# Patient Record
Sex: FEMALE | NOT HISPANIC OR LATINO | URBAN - METROPOLITAN AREA
[De-identification: names, ages, dates, MRNs, and addresses within clinical notes are randomized per-mention and may not be internally consistent; named-entity substitution may affect disease eponyms.]

---

## 2024-03-15 ENCOUNTER — HOSPITAL ENCOUNTER (OUTPATIENT)
Dept: RADIOLOGY | Facility: CLINIC | Age: 24
Discharge: HOME | End: 2024-03-15
Payer: COMMERCIAL

## 2024-03-15 DIAGNOSIS — T83.32XA DISPLACEMENT OF INTRAUTERINE CONTRACEPTIVE DEVICE, INITIAL ENCOUNTER: ICD-10-CM

## 2024-03-15 PROCEDURE — 76856 US EXAM PELVIC COMPLETE: CPT | Performed by: RADIOLOGY

## 2024-03-15 PROCEDURE — 76830 TRANSVAGINAL US NON-OB: CPT | Performed by: RADIOLOGY

## 2024-03-15 PROCEDURE — 76856 US EXAM PELVIC COMPLETE: CPT

## 2024-11-19 ENCOUNTER — APPOINTMENT (OUTPATIENT)
Dept: OBSTETRICS AND GYNECOLOGY | Facility: CLINIC | Age: 24
End: 2024-11-19
Payer: COMMERCIAL

## 2025-01-21 ENCOUNTER — APPOINTMENT (OUTPATIENT)
Dept: PRIMARY CARE | Facility: CLINIC | Age: 25
End: 2025-01-21
Payer: COMMERCIAL

## 2025-01-21 VITALS
BODY MASS INDEX: 30 KG/M2 | DIASTOLIC BLOOD PRESSURE: 84 MMHG | HEIGHT: 62 IN | OXYGEN SATURATION: 99 % | WEIGHT: 163 LBS | HEART RATE: 94 BPM | SYSTOLIC BLOOD PRESSURE: 130 MMHG | TEMPERATURE: 98 F

## 2025-01-21 DIAGNOSIS — Z72.89 CURRENT EVERY DAY VAPING: ICD-10-CM

## 2025-01-21 DIAGNOSIS — Z30.432 AWAITING REMOVAL OF CONTRACEPTIVE INTRAUTERINE DEVICE (IUD): ICD-10-CM

## 2025-01-21 DIAGNOSIS — Z13.220 SCREENING FOR LIPID DISORDERS: ICD-10-CM

## 2025-01-21 DIAGNOSIS — Z13.1 SCREENING FOR DIABETES MELLITUS (DM): ICD-10-CM

## 2025-01-21 DIAGNOSIS — Z00.00 ANNUAL PHYSICAL EXAM: Primary | ICD-10-CM

## 2025-01-21 PROCEDURE — 3008F BODY MASS INDEX DOCD: CPT

## 2025-01-21 PROCEDURE — 99385 PREV VISIT NEW AGE 18-39: CPT

## 2025-01-21 PROCEDURE — 1036F TOBACCO NON-USER: CPT

## 2025-01-21 RX ORDER — ACYCLOVIR 800 MG/1
TABLET ORAL
COMMUNITY
Start: 2025-01-07

## 2025-01-21 RX ORDER — ESCITALOPRAM OXALATE 5 MG/1
1 TABLET ORAL
COMMUNITY
Start: 2025-01-10

## 2025-01-21 RX ORDER — LAMOTRIGINE 100 MG/1
1 TABLET ORAL
COMMUNITY
Start: 2025-01-10

## 2025-01-21 RX ORDER — IBUPROFEN 200 MG
1 TABLET ORAL EVERY 24 HOURS
Qty: 30 PATCH | Refills: 0 | Status: SHIPPED | OUTPATIENT
Start: 2025-01-21 | End: 2025-02-20

## 2025-01-21 ASSESSMENT — PAIN SCALES - GENERAL: PAINLEVEL_OUTOF10: 0-NO PAIN

## 2025-01-21 ASSESSMENT — PATIENT HEALTH QUESTIONNAIRE - PHQ9
2. FEELING DOWN, DEPRESSED OR HOPELESS: NOT AT ALL
1. LITTLE INTEREST OR PLEASURE IN DOING THINGS: NOT AT ALL
SUM OF ALL RESPONSES TO PHQ9 QUESTIONS 1 AND 2: 0

## 2025-01-21 ASSESSMENT — VISUAL ACUITY: OU: 1

## 2025-01-21 NOTE — PROGRESS NOTES
Subjective   Patient ID: Lore Owens is a 24 y.o. female who presents for Annual Exam.    HPI     Lore Owens presents for annual physical exam.  Prashanth is a new patient to this provider, establishing care today.     She does have concerns for persistent cough over the last 6 weeks.  Does vape regularly and is interested in quitting.       Patient's recent visit notes, medication and allergy lists, past medical surgical social hx, immunization, vitals, problem list, recent tests were reviewed by me for pertinence to this visit.      PMH:   Anxiety/OCD/Bipolar II- seeing psychiatrist through [facility].  Sees her every 3 months for medication refills. Was seeing Counselor weekly but now seeing her every few months.  Currently taking escitalopram and lamotrigine 100 mg daily- medications manage symptoms well.     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  She is looking to establish with new GYN.  No monthly menses with IUD. No concerns for STIs today.           Vaccinations:  Tdap: 2022; due 2032  Flu Vaccine: declines        Review of Systems  GENERAL - Denies fever/chills, recent illness, unexplained weight loss  HEENT- Denies change in vision, double vision, blurred. Denies hearing changes, ear pain. Denies nose bleeds. Denies sore throat, difficulty swallowing.    RESP - Denies SOB.  Persistent cough x 6 weeks- see HPI  CVS - Denies CP, palpitations  GI - Denies nausea or abdominal pain, hematochezia/melena  - Denies urinary frequency, urgency or incontinence.  Denies nocturia.   NEURO - Denies headache, dizziness  MSK - Denies  "joint, neck or back pain  Skin - Denies abnormal lesions, rash  PSYCH-Denies anxiety, depression, changes in mood          Objective   /84   Pulse 94   Temp 36.7 °C (98 °F)   Ht 1.562 m (5' 1.5\")   Wt 73.9 kg (163 lb)   SpO2 99%   BMI 30.30 kg/m²     Physical Exam  Vitals and nursing note reviewed.   Constitutional:       General: She is not in acute distress.     Appearance: Normal appearance. She is well-developed and well-groomed.   HENT:      Head: Normocephalic.      Jaw: There is normal jaw occlusion.      Right Ear: Hearing, tympanic membrane, ear canal and external ear normal.      Left Ear: Hearing, tympanic membrane, ear canal and external ear normal.      Nose: Nose normal.      Mouth/Throat:      Lips: Pink.      Mouth: Mucous membranes are moist.      Pharynx: Oropharynx is clear. Uvula midline.   Eyes:      General: Lids are normal. Vision grossly intact. Gaze aligned appropriately.      Extraocular Movements: Extraocular movements intact.      Conjunctiva/sclera: Conjunctivae normal.      Pupils: Pupils are equal, round, and reactive to light.   Neck:      Thyroid: No thyromegaly or thyroid tenderness.      Vascular: No carotid bruit or JVD.      Trachea: Trachea and phonation normal.   Cardiovascular:      Rate and Rhythm: Normal rate and regular rhythm.      Pulses: Normal pulses.      Heart sounds: Normal heart sounds, S1 normal and S2 normal.   Pulmonary:      Effort: Pulmonary effort is normal.      Breath sounds: Normal breath sounds and air entry.   Abdominal:      General: Bowel sounds are normal. There is no distension.      Palpations: Abdomen is soft. There is no hepatomegaly, splenomegaly or mass.      Tenderness: There is no abdominal tenderness. There is no right CVA tenderness, left CVA tenderness, guarding or rebound.   Musculoskeletal:         General: Normal range of motion.      Cervical back: Normal, full passive range of motion without pain, normal range of motion and " neck supple.      Thoracic back: Normal.      Lumbar back: Normal.      Right lower leg: No edema.      Left lower leg: No edema.   Lymphadenopathy:      Cervical: No cervical adenopathy.   Skin:     General: Skin is warm and dry.      Capillary Refill: Capillary refill takes less than 2 seconds.   Neurological:      General: No focal deficit present.      Mental Status: She is alert and oriented to person, place, and time.      Cranial Nerves: Cranial nerves 2-12 are intact.      Sensory: Sensation is intact.      Motor: Motor function is intact.      Coordination: Coordination is intact.      Gait: Gait is intact.   Psychiatric:         Attention and Perception: Attention normal.         Mood and Affect: Mood and affect normal.         Speech: Speech normal.         Behavior: Behavior normal. Behavior is cooperative.           Assessment & Plan  Annual physical exam  Well adult exam.  1. Age appropriate preventative measures reviewed. Due for pap screening- referral to GYN given.   2. Encouraged healthy diet and exercise.  3. Immunizations- Reviewed, Tdap UTD, declines flu  4. Labs- screening labs ordered today  5. Medications- Reviewed-    *Follow-up in 1 year for repeat annual physical exam. Patient verbalizes understanding  regarding plan of care and all questions answered.         Current every day vaping  Discussed vape cessation.    She is interested in beginning nicotine patch. Explained intended effects, potential side effects, and schedule of dosages of the medication.  History of anxiety/depression- will hold of on chantix or Wellbutrin but she may discuss with her psychiatrist.   Orders:    nicotine (Nicoderm CQ) 21 mg/24 hr patch; Place 1 patch over 24 hours on the skin once every 24 hours.    Screening for diabetes mellitus (DM)    Orders:    Glucose, fasting; Future    Screening for lipid disorders    Orders:    Lipid Panel; Future    Awaiting removal of contraceptive intrauterine device  (IUD)    Orders:    Referral to Gynecology; Future

## 2025-02-09 LAB
CHOLEST SERPL-MCNC: 165 MG/DL
CHOLEST/HDLC SERPL: 3.8 (CALC)
GLUCOSE P FAST SERPL-MCNC: 91 MG/DL (ref 65–99)
HDLC SERPL-MCNC: 44 MG/DL
LDLC SERPL CALC-MCNC: 102 MG/DL (CALC)
NONHDLC SERPL-MCNC: 121 MG/DL (CALC)
TRIGL SERPL-MCNC: 93 MG/DL

## 2025-02-20 ENCOUNTER — TELEPHONE (OUTPATIENT)
Dept: OBSTETRICS AND GYNECOLOGY | Facility: CLINIC | Age: 25
End: 2025-02-20

## 2025-02-20 ENCOUNTER — OFFICE VISIT (OUTPATIENT)
Dept: OBSTETRICS AND GYNECOLOGY | Facility: CLINIC | Age: 25
End: 2025-02-20
Payer: COMMERCIAL

## 2025-02-20 ENCOUNTER — HOSPITAL ENCOUNTER (OUTPATIENT)
Dept: RADIOLOGY | Facility: CLINIC | Age: 25
Discharge: HOME | End: 2025-02-20
Payer: COMMERCIAL

## 2025-02-20 VITALS
WEIGHT: 172 LBS | BODY MASS INDEX: 31.65 KG/M2 | SYSTOLIC BLOOD PRESSURE: 120 MMHG | HEIGHT: 62 IN | DIASTOLIC BLOOD PRESSURE: 75 MMHG

## 2025-02-20 DIAGNOSIS — Z01.419 ENCOUNTER FOR ANNUAL ROUTINE GYNECOLOGICAL EXAMINATION: Primary | ICD-10-CM

## 2025-02-20 DIAGNOSIS — Z30.432 AWAITING REMOVAL OF CONTRACEPTIVE INTRAUTERINE DEVICE (IUD): ICD-10-CM

## 2025-02-20 DIAGNOSIS — Z30.431 INTRAUTERINE DEVICE SURVEILLANCE: ICD-10-CM

## 2025-02-20 PROCEDURE — 76856 US EXAM PELVIC COMPLETE: CPT

## 2025-02-20 PROCEDURE — 99385 PREV VISIT NEW AGE 18-39: CPT

## 2025-02-20 PROCEDURE — 1036F TOBACCO NON-USER: CPT

## 2025-02-20 PROCEDURE — 3008F BODY MASS INDEX DOCD: CPT

## 2025-02-20 RX ORDER — LEVONORGESTREL 52 MG/1
1 INTRAUTERINE DEVICE INTRAUTERINE ONCE
COMMUNITY

## 2025-02-20 ASSESSMENT — ENCOUNTER SYMPTOMS
ABDOMINAL PAIN: 0
VOMITING: 0
HEADACHES: 0
SHORTNESS OF BREATH: 0
DEPRESSION: 0
FEVER: 0
NAUSEA: 0
COUGH: 0
OCCASIONAL FEELINGS OF UNSTEADINESS: 0
DYSURIA: 0
UNEXPECTED WEIGHT CHANGE: 0
LOSS OF SENSATION IN FEET: 0
COLOR CHANGE: 0
CHILLS: 0
FATIGUE: 0
DIZZINESS: 0

## 2025-02-20 ASSESSMENT — PATIENT HEALTH QUESTIONNAIRE - PHQ9
1. LITTLE INTEREST OR PLEASURE IN DOING THINGS: NOT AT ALL
2. FEELING DOWN, DEPRESSED OR HOPELESS: NOT AT ALL
SUM OF ALL RESPONSES TO PHQ9 QUESTIONS 1 & 2: 0

## 2025-02-20 ASSESSMENT — PAIN SCALES - GENERAL: PAINLEVEL_OUTOF10: 0-NO PAIN

## 2025-02-20 ASSESSMENT — LIFESTYLE VARIABLES
HOW OFTEN DO YOU HAVE A DRINK CONTAINING ALCOHOL: NEVER
HOW OFTEN DO YOU HAVE SIX OR MORE DRINKS ON ONE OCCASION: NEVER
AUDIT-C TOTAL SCORE: 0
SKIP TO QUESTIONS 9-10: 1
HOW MANY STANDARD DRINKS CONTAINING ALCOHOL DO YOU HAVE ON A TYPICAL DAY: PATIENT DOES NOT DRINK

## 2025-02-20 NOTE — PROGRESS NOTES
"Subjective   Lore Owens is a 24 y.o. female who is here for a routine GYN exam as a new patient establishing care.  -Had Mirena IUD inserted about 7-8 yrs ago at [facility]. Absent bleeding. Denies pelvic pain. She would like a new device.   - Had pelvic US done 3/2024 due to lack of visualization of IUD strings showing IUD in place.  -Denies breast or vaginal symptoms or concerns today.  -Denies any new partners; she is engaged.  -Pt has vulvar birthmark that she recalls having her whole life.    Complaints:   none  Periods: absent with IUD   Current contraception: Mirena IUD   History of abnormal Pap smear: no  History of abnormal mammogram: no      OB History          0    Para   0    Term   0       0    AB   0    Living   0         SAB   0    IAB   0    Ectopic   0    Multiple   0    Live Births   0                  Review of Systems   Constitutional:  Negative for chills, fatigue, fever and unexpected weight change.   Respiratory:  Negative for cough and shortness of breath.    Gastrointestinal:  Negative for abdominal pain, nausea and vomiting.   Genitourinary:  Negative for dyspareunia, dysuria, pelvic pain and vaginal discharge.   Skin:  Negative for color change and rash.   Neurological:  Negative for dizziness and headaches.       Objective   /75   Ht 1.562 m (5' 1.5\")   Wt 78 kg (172 lb)   BMI 31.97 kg/m²        General:   Alert and oriented, in no acute distress   Neck: Supple. No visible thyromegaly.    Breast/Axilla: Normal to palpation bilaterally without masses, skin changes, or nipple discharge.    Abdomen: Soft, non-tender, without masses or organomegaly   Vulva: Normal architecture without erythema, masses, or lesions. Oval raised pink birthmark along right labia/groin region   Vagina: Normal mucosa without lesions, masses, or atrophy. No abnormal vaginal discharge.    Cervix: Normal without masses, lesions, or signs of cervicitis; unable to visualize iud " strings from os; scant amt mixed discharge/menstrual blood; no cmt   Uterus: Normal, mobile, non-enlarged uterus   Adnexa: Normal without masses or lesions   Pelvic Floor Normal    Psych Normal affect. Normal mood.      Assessment/Plan   Diagnoses and all orders for this visit:  Encounter for annual routine gynecological examination  -     THINPREP PAP TEST  Intrauterine device surveillance  -     US PELVIS TRANSABDOMINAL WITH TRANSVAGINAL; Future  - Will obtain US to ensure IUD in place before removal/reinsertion.   - Consent signed for new device. Plan to return for removal/reinsertion procedure. Would advise cytotec beforehand.  Awaiting removal of contraceptive intrauterine device (IUD)  -     Referral to Gynecology    Elvira Sullivan PA-C

## 2025-03-06 LAB
CYTOLOGY CMNT CVX/VAG CYTO-IMP: NORMAL
LAB AP CONTRACEPTIVE HISTORY: NORMAL
LAB AP HPV GENOTYPE QUESTION: YES
LAB AP HPV HR: NORMAL
LABORATORY COMMENT REPORT: NORMAL
PATH REPORT.TOTAL CANCER: NORMAL

## 2025-04-01 NOTE — PROGRESS NOTES
IUD Removal    Performed by: Keo Haddad MD  Authorized by: Keo Haddad MD    Procedure: IUD removal and insertion    Consent obtained by patient, parent, or legal power of  - including discussion of procedure risks and benefits, patient questions answered, and patient education provided: yes    Reason for removal: patient request    Strings visualized: no (Alligator forceps used to retrieve IUD strings from cervical canal without issue.)    Cervix cleaned with: iodopovidone    Tenaculum applied to cervix: yes    IUD grasped by forceps: yes    IUD removed: yes    Date/Time of Removal:  4/3/2025 1:20 PM  Removed without complications: yes    IUD intact: yes    Pregnancy risk comment:  Negative UPT  Date/Time of Insertion:  4/3/2025 1:22 PM  Speculum placed in vagina: yes    Cervix cleaned and prepped: yes    Tenaculum/Allis/Ring Forceps applied to cervix: yes    Uterus sound depth (cm):  7  IUD inserted without complications: yes    OSM: levonorgestrel 20 mcg/24hr  Strings trimmed to (cm):  3  Patient tolerated procedure well: yes    Estimated blood loss (mL): <5 mL.  Intended removal date: 8 years    Insertion comments: Patient tolerated removal and replacement well without issue. Provided with educational pamphlet regarding the Mirena. Advised to follow up in the office in 4 weeks for a string check.

## 2025-04-03 ENCOUNTER — PROCEDURE VISIT (OUTPATIENT)
Dept: OBSTETRICS AND GYNECOLOGY | Facility: CLINIC | Age: 25
End: 2025-04-03
Payer: COMMERCIAL

## 2025-04-03 VITALS — WEIGHT: 180.2 LBS | BODY MASS INDEX: 33.5 KG/M2 | SYSTOLIC BLOOD PRESSURE: 138 MMHG | DIASTOLIC BLOOD PRESSURE: 72 MMHG

## 2025-04-03 DIAGNOSIS — Z30.433 ENCOUNTER FOR IUD REMOVAL AND REINSERTION: Primary | ICD-10-CM

## 2025-04-03 LAB — PREGNANCY TEST URINE, POC: NEGATIVE

## 2025-04-03 PROCEDURE — 58301 REMOVE INTRAUTERINE DEVICE: CPT | Performed by: STUDENT IN AN ORGANIZED HEALTH CARE EDUCATION/TRAINING PROGRAM

## 2025-04-03 PROCEDURE — 81025 URINE PREGNANCY TEST: CPT | Performed by: STUDENT IN AN ORGANIZED HEALTH CARE EDUCATION/TRAINING PROGRAM

## 2025-04-03 PROCEDURE — 58300 INSERT INTRAUTERINE DEVICE: CPT | Performed by: STUDENT IN AN ORGANIZED HEALTH CARE EDUCATION/TRAINING PROGRAM

## 2025-04-03 ASSESSMENT — ENCOUNTER SYMPTOMS
LOSS OF SENSATION IN FEET: 0
DEPRESSION: 0
OCCASIONAL FEELINGS OF UNSTEADINESS: 0

## 2025-04-03 ASSESSMENT — PAIN SCALES - GENERAL: PAINLEVEL_OUTOF10: 0-NO PAIN

## 2025-05-01 ENCOUNTER — APPOINTMENT (OUTPATIENT)
Dept: OBSTETRICS AND GYNECOLOGY | Facility: CLINIC | Age: 25
End: 2025-05-01
Payer: COMMERCIAL

## 2025-07-22 ENCOUNTER — APPOINTMENT (OUTPATIENT)
Dept: PRIMARY CARE | Facility: CLINIC | Age: 25
End: 2025-07-22
Payer: COMMERCIAL

## 2025-07-22 VITALS
DIASTOLIC BLOOD PRESSURE: 82 MMHG | TEMPERATURE: 98.3 F | HEART RATE: 103 BPM | OXYGEN SATURATION: 99 % | HEIGHT: 62 IN | WEIGHT: 182 LBS | SYSTOLIC BLOOD PRESSURE: 126 MMHG | BODY MASS INDEX: 33.49 KG/M2

## 2025-07-22 DIAGNOSIS — M79.2 RADICULAR PAIN IN LEFT ARM: ICD-10-CM

## 2025-07-22 DIAGNOSIS — T14.8XXA BRUISING: Primary | ICD-10-CM

## 2025-07-22 DIAGNOSIS — F31.9 BIPOLAR AFFECTIVE DISORDER, REMISSION STATUS UNSPECIFIED (MULTI): ICD-10-CM

## 2025-07-22 DIAGNOSIS — Z30.011 ENCOUNTER FOR INITIAL PRESCRIPTION OF CONTRACEPTIVE PILLS: ICD-10-CM

## 2025-07-22 DIAGNOSIS — R20.2 PARESTHESIA: ICD-10-CM

## 2025-07-22 PROBLEM — M79.672 LEFT FOOT PAIN: Status: RESOLVED | Noted: 2025-07-22 | Resolved: 2025-07-22

## 2025-07-22 PROBLEM — H60.93 OTITIS EXTERNA OF BOTH EARS: Status: ACTIVE | Noted: 2025-07-22

## 2025-07-22 PROBLEM — B34.9 VIRAL DISEASE: Status: ACTIVE | Noted: 2025-07-22

## 2025-07-22 PROBLEM — Z98.890 S/P FOOT SURGERY, LEFT: Status: ACTIVE | Noted: 2025-07-22

## 2025-07-22 PROBLEM — M76.62 ACHILLES TENDINITIS OF LEFT LOWER EXTREMITY: Status: ACTIVE | Noted: 2025-07-22

## 2025-07-22 PROBLEM — J20.9 ACUTE BRONCHITIS: Status: ACTIVE | Noted: 2025-07-22

## 2025-07-22 PROBLEM — R51.9 HEADACHE: Status: RESOLVED | Noted: 2025-07-22 | Resolved: 2025-07-22

## 2025-07-22 PROBLEM — R11.2 NAUSEA & VOMITING: Status: RESOLVED | Noted: 2025-07-22 | Resolved: 2025-07-22

## 2025-07-22 PROBLEM — N39.0 ACUTE LOWER URINARY TRACT INFECTION: Status: ACTIVE | Noted: 2025-07-22

## 2025-07-22 PROBLEM — J45.909 ASTHMA WITHOUT STATUS ASTHMATICUS (HHS-HCC): Status: ACTIVE | Noted: 2025-07-22

## 2025-07-22 PROBLEM — M21.42 ACQUIRED PES PLANUS OF BOTH FEET: Status: ACTIVE | Noted: 2025-07-22

## 2025-07-22 PROBLEM — J06.9 ACUTE UPPER RESPIRATORY INFECTION: Status: ACTIVE | Noted: 2025-07-22

## 2025-07-22 PROBLEM — R05.9 COUGH: Status: RESOLVED | Noted: 2025-07-22 | Resolved: 2025-07-22

## 2025-07-22 PROBLEM — R06.00 DYSPNEA: Status: ACTIVE | Noted: 2025-07-22

## 2025-07-22 PROBLEM — M21.41 ACQUIRED PES PLANUS OF BOTH FEET: Status: ACTIVE | Noted: 2025-07-22

## 2025-07-22 PROBLEM — S93.409A ANKLE SPRAIN: Status: ACTIVE | Noted: 2025-07-22

## 2025-07-22 PROBLEM — M25.572 LEFT ANKLE PAIN: Status: RESOLVED | Noted: 2025-07-22 | Resolved: 2025-07-22

## 2025-07-22 PROBLEM — N83.209 OVARIAN CYST: Status: ACTIVE | Noted: 2025-07-22

## 2025-07-22 PROBLEM — R07.89 TIGHT CHEST: Status: ACTIVE | Noted: 2025-07-22

## 2025-07-22 PROBLEM — J11.1 INFLUENZA-LIKE ILLNESS: Status: ACTIVE | Noted: 2025-07-22

## 2025-07-22 PROCEDURE — 3008F BODY MASS INDEX DOCD: CPT

## 2025-07-22 PROCEDURE — 1036F TOBACCO NON-USER: CPT

## 2025-07-22 PROCEDURE — 99214 OFFICE O/P EST MOD 30 MIN: CPT

## 2025-07-22 RX ORDER — NORETHINDRONE ACETATE AND ETHINYL ESTRADIOL .02; 1 MG/1; MG/1
1 TABLET ORAL DAILY
Qty: 21 TABLET | Refills: 11 | Status: SHIPPED | OUTPATIENT
Start: 2025-07-22 | End: 2026-07-22

## 2025-07-22 ASSESSMENT — ENCOUNTER SYMPTOMS
PALPITATIONS: 0
COUGH: 0
NERVOUS/ANXIOUS: 0
DIZZINESS: 0
CONSTITUTIONAL NEGATIVE: 1
MYALGIAS: 1
SHORTNESS OF BREATH: 0
SLEEP DISTURBANCE: 0
ARTHRALGIAS: 0
LIGHT-HEADEDNESS: 0
BRUISES/BLEEDS EASILY: 1
ADENOPATHY: 0
DYSPHORIC MOOD: 0

## 2025-07-22 ASSESSMENT — PATIENT HEALTH QUESTIONNAIRE - PHQ9
SUM OF ALL RESPONSES TO PHQ9 QUESTIONS 1 AND 2: 0
2. FEELING DOWN, DEPRESSED OR HOPELESS: NOT AT ALL
1. LITTLE INTEREST OR PLEASURE IN DOING THINGS: NOT AT ALL

## 2025-07-22 ASSESSMENT — COLUMBIA-SUICIDE SEVERITY RATING SCALE - C-SSRS
2. HAVE YOU ACTUALLY HAD ANY THOUGHTS OF KILLING YOURSELF?: NO
6. HAVE YOU EVER DONE ANYTHING, STARTED TO DO ANYTHING, OR PREPARED TO DO ANYTHING TO END YOUR LIFE?: NO
1. IN THE PAST MONTH, HAVE YOU WISHED YOU WERE DEAD OR WISHED YOU COULD GO TO SLEEP AND NOT WAKE UP?: NO

## 2025-07-22 NOTE — PROGRESS NOTES
Subjective     Patient ID: Lore Owens is a 24 y.o. female who presents for Pain (Left arm pain from bicep down to fingers /IUD concern and requested pill/Bruising from thighs down , comes and goes /Patient stopped talking all medication in march ).      LUCINDA Alford presents for multiple concerns today.     1.  Left arm pain, numbness from mid-bicep radiating down to her fingers, feels numbness and nerve type pain more in her forearm.  Comes and goes over the last month.  Denies any injury, no new exercising, no heavy lifting or falls.  Works from home, sedentary computer job.     2.  Experiencing more bruising, predominantly on her legs.  Bruising resolves quickly but seems like its occurring more often.  Denies any injuries.     3. IUD placed on 4/3/2025 by OBGYN became dislodged.  She is unable to get appointment with GYN for several months, does not want to be without protection.  Now requesting to restart oral birth control.  Was on oral contraception prior to IUD and tolerated well.      4. History of Bipolar disorder, has stopped her Lexapro and Lamictal.  She reports Lamictal was making her very tired.  She did not want to continue Lexapro without mood stabilizer.  Feels as though her mood is stable today.  Was seeing online psychiatrist. , no recent follow up    Patient's recent visit notes, medication and allergy lists, past medical surgical social hx, immunization, vitals, problem list, recent tests were reviewed by me for pertinence to this visit.        Review of Systems   Constitutional: Negative.    Respiratory:  Negative for cough and shortness of breath.    Cardiovascular:  Negative for chest pain and palpitations.   Musculoskeletal:  Positive for myalgias. Negative for arthralgias.   Neurological:  Negative for dizziness and light-headedness.   Hematological:  Negative for adenopathy. Bruises/bleeds easily.   Psychiatric/Behavioral:  Negative for dysphoric mood, sleep disturbance and suicidal  "ideas. The patient is not nervous/anxious.              Objective   /82 (BP Location: Left arm, Patient Position: Sitting, BP Cuff Size: Adult)   Pulse 103   Temp 36.8 °C (98.3 °F) (Temporal)   Ht 1.575 m (5' 2\")   Wt 82.6 kg (182 lb)   LMP 06/30/2025 (Approximate)   SpO2 99%   BMI 33.29 kg/m²       Physical Exam  Vitals and nursing note reviewed.   Constitutional:       General: She is not in acute distress.     Appearance: Normal appearance.     Cardiovascular:      Rate and Rhythm: Normal rate and regular rhythm.   Pulmonary:      Effort: Pulmonary effort is normal.      Breath sounds: Normal breath sounds and air entry.     Musculoskeletal:      Right shoulder: Normal.      Left shoulder: Normal.      Right upper arm: Normal.      Left upper arm: No swelling, edema, deformity, tenderness or bony tenderness.      Left elbow: No swelling or deformity. Normal range of motion. No tenderness.      Right forearm: Normal.      Left forearm: Tenderness present. No swelling, edema, deformity or bony tenderness.      Left wrist: Normal. No tenderness. Normal range of motion. Normal pulse.      Right hand: Normal.      Left hand: Normal. No swelling or tenderness. Normal range of motion. Normal strength. Normal sensation. Normal capillary refill. Normal pulse.        Arms:       Cervical back: Normal.      Thoracic back: Normal.      Lumbar back: Normal.      Comments: Strength/hand grasps equal bilaterally     Skin:     Comments: Skin dry and intact, no obvious bruising today     Neurological:      Mental Status: She is alert.     Psychiatric:         Behavior: Behavior is cooperative.             Assessment & Plan  Bruising  New concern, unclear etiology  Obtain labs as discussed, suspect possible NATE  Will follow up with results upon completion  Orders:    CBC and Auto Differential; Future    Iron and TIBC; Future    Ferritin; Future    Radicular pain in left arm  Acute new problem  No obvious injury  May " use OTC NSAIDS for discomfort, continue stretching   Follow up in 2-3 weeks if symptoms persist, will consider PT       Paresthesia    Orders:    CBC and Auto Differential; Future    Vitamin B12; Future    Encounter for initial prescription of contraceptive pills  Discussed methods, compliance, and efficacy of birth control.  Discussed medication desired effects, potential side effects, and how to administer the medication.   Educated on risk and signs of blood clot including shortness of breath, chest pain, lower extremity swelling, redness, warm to touch.   Watch for signs of heavy/uncontrollable bleeding, or other concerning symptoms.   Avoid tobacco use.   Patient verbalizes understanding regarding plan of care and all questions answered.  Follow-up in 1 year or sooner if needed for signs of blood clot, heavy/uncontrollable bleeding, or concerning symptoms.    Orders:    norethindrone ac-eth estradioL (Loestrin 1/20, 21,) 1-20 mg-mcg tablet; Take 1 tablet by mouth once daily.    Bipolar affective disorder, remission status unspecified (Multi)  Chronic condition, needing follow up, mood stable today  Reestablish with psychiatrist for medication management   No thoughts of SI/HI/self-harm                 Radhika Granger, APRN-CNP